# Patient Record
Sex: FEMALE | Race: BLACK OR AFRICAN AMERICAN | NOT HISPANIC OR LATINO | URBAN - METROPOLITAN AREA
[De-identification: names, ages, dates, MRNs, and addresses within clinical notes are randomized per-mention and may not be internally consistent; named-entity substitution may affect disease eponyms.]

---

## 2024-07-26 ENCOUNTER — APPOINTMENT (OUTPATIENT)
Dept: PRIMARY CARE | Facility: CLINIC | Age: 39
End: 2024-07-26
Payer: COMMERCIAL

## 2024-07-26 ENCOUNTER — LAB (OUTPATIENT)
Dept: LAB | Facility: LAB | Age: 39
End: 2024-07-26
Payer: COMMERCIAL

## 2024-07-26 VITALS
HEART RATE: 74 BPM | BODY MASS INDEX: 26.05 KG/M2 | HEIGHT: 63 IN | WEIGHT: 147 LBS | OXYGEN SATURATION: 98 % | SYSTOLIC BLOOD PRESSURE: 110 MMHG | DIASTOLIC BLOOD PRESSURE: 62 MMHG | RESPIRATION RATE: 19 BRPM

## 2024-07-26 DIAGNOSIS — Z12.31 SCREENING MAMMOGRAM FOR BREAST CANCER: ICD-10-CM

## 2024-07-26 DIAGNOSIS — J45.20 MILD INTERMITTENT ASTHMA WITHOUT COMPLICATION (HHS-HCC): ICD-10-CM

## 2024-07-26 DIAGNOSIS — E55.9 VITAMIN D DEFICIENCY: ICD-10-CM

## 2024-07-26 DIAGNOSIS — R03.0 ELEVATED BLOOD PRESSURE READING WITHOUT DIAGNOSIS OF HYPERTENSION: ICD-10-CM

## 2024-07-26 DIAGNOSIS — Z00.00 HEALTH CARE MAINTENANCE: ICD-10-CM

## 2024-07-26 DIAGNOSIS — Z30.09 BIRTH CONTROL COUNSELING: ICD-10-CM

## 2024-07-26 DIAGNOSIS — Z76.89 ENCOUNTER TO ESTABLISH CARE: ICD-10-CM

## 2024-07-26 DIAGNOSIS — Z00.00 HEALTH CARE MAINTENANCE: Primary | ICD-10-CM

## 2024-07-26 PROBLEM — N93.9 ABNORMAL UTERINE BLEEDING (AUB): Status: RESOLVED | Noted: 2024-07-26 | Resolved: 2024-07-26

## 2024-07-26 LAB
25(OH)D3 SERPL-MCNC: 32 NG/ML (ref 30–100)
ALBUMIN SERPL BCP-MCNC: 4.6 G/DL (ref 3.4–5)
ALP SERPL-CCNC: 71 U/L (ref 33–110)
ALT SERPL W P-5'-P-CCNC: 13 U/L (ref 7–45)
ANION GAP SERPL CALC-SCNC: 11 MMOL/L (ref 10–20)
AST SERPL W P-5'-P-CCNC: 16 U/L (ref 9–39)
BASOPHILS # BLD AUTO: 0.03 X10*3/UL (ref 0–0.1)
BASOPHILS NFR BLD AUTO: 0.6 %
BILIRUB SERPL-MCNC: 0.5 MG/DL (ref 0–1.2)
BUN SERPL-MCNC: 10 MG/DL (ref 6–23)
CALCIUM SERPL-MCNC: 9.6 MG/DL (ref 8.6–10.3)
CHLORIDE SERPL-SCNC: 105 MMOL/L (ref 98–107)
CHOLEST SERPL-MCNC: 180 MG/DL (ref 0–199)
CHOLESTEROL/HDL RATIO: 4.6
CO2 SERPL-SCNC: 25 MMOL/L (ref 21–32)
CREAT SERPL-MCNC: 0.93 MG/DL (ref 0.5–1.05)
EGFRCR SERPLBLD CKD-EPI 2021: 80 ML/MIN/1.73M*2
EOSINOPHIL # BLD AUTO: 0.08 X10*3/UL (ref 0–0.7)
EOSINOPHIL NFR BLD AUTO: 1.6 %
ERYTHROCYTE [DISTWIDTH] IN BLOOD BY AUTOMATED COUNT: 13 % (ref 11.5–14.5)
EST. AVERAGE GLUCOSE BLD GHB EST-MCNC: 117 MG/DL
GLUCOSE SERPL-MCNC: 80 MG/DL (ref 74–99)
HBA1C MFR BLD: 5.7 %
HCT VFR BLD AUTO: 41.3 % (ref 36–46)
HDLC SERPL-MCNC: 39.1 MG/DL
HGB BLD-MCNC: 13.3 G/DL (ref 12–16)
IMM GRANULOCYTES # BLD AUTO: 0.01 X10*3/UL (ref 0–0.7)
IMM GRANULOCYTES NFR BLD AUTO: 0.2 % (ref 0–0.9)
LDLC SERPL CALC-MCNC: 117 MG/DL
LYMPHOCYTES # BLD AUTO: 2.35 X10*3/UL (ref 1.2–4.8)
LYMPHOCYTES NFR BLD AUTO: 45.9 %
MCH RBC QN AUTO: 27.2 PG (ref 26–34)
MCHC RBC AUTO-ENTMCNC: 32.2 G/DL (ref 32–36)
MCV RBC AUTO: 85 FL (ref 80–100)
MONOCYTES # BLD AUTO: 0.27 X10*3/UL (ref 0.1–1)
MONOCYTES NFR BLD AUTO: 5.3 %
NEUTROPHILS # BLD AUTO: 2.38 X10*3/UL (ref 1.2–7.7)
NEUTROPHILS NFR BLD AUTO: 46.4 %
NON HDL CHOLESTEROL: 141 MG/DL (ref 0–149)
NRBC BLD-RTO: 0 /100 WBCS (ref 0–0)
PLATELET # BLD AUTO: 344 X10*3/UL (ref 150–450)
POTASSIUM SERPL-SCNC: 4.1 MMOL/L (ref 3.5–5.3)
PROT SERPL-MCNC: 7.6 G/DL (ref 6.4–8.2)
RBC # BLD AUTO: 4.89 X10*6/UL (ref 4–5.2)
SODIUM SERPL-SCNC: 137 MMOL/L (ref 136–145)
TRIGL SERPL-MCNC: 119 MG/DL (ref 0–149)
TSH SERPL-ACNC: 1.97 MIU/L (ref 0.44–3.98)
VLDL: 24 MG/DL (ref 0–40)
WBC # BLD AUTO: 5.1 X10*3/UL (ref 4.4–11.3)

## 2024-07-26 PROCEDURE — 84443 ASSAY THYROID STIM HORMONE: CPT

## 2024-07-26 PROCEDURE — 36415 COLL VENOUS BLD VENIPUNCTURE: CPT

## 2024-07-26 PROCEDURE — 1036F TOBACCO NON-USER: CPT

## 2024-07-26 PROCEDURE — 85025 COMPLETE CBC W/AUTO DIFF WBC: CPT

## 2024-07-26 PROCEDURE — 82306 VITAMIN D 25 HYDROXY: CPT

## 2024-07-26 PROCEDURE — 83036 HEMOGLOBIN GLYCOSYLATED A1C: CPT

## 2024-07-26 PROCEDURE — 3008F BODY MASS INDEX DOCD: CPT

## 2024-07-26 PROCEDURE — 80061 LIPID PANEL: CPT

## 2024-07-26 PROCEDURE — 80053 COMPREHEN METABOLIC PANEL: CPT

## 2024-07-26 PROCEDURE — 99386 PREV VISIT NEW AGE 40-64: CPT

## 2024-07-26 RX ORDER — DROSPIRENONE 4 MG/1
1 TABLET, FILM COATED ORAL DAILY
COMMUNITY
Start: 2022-08-09

## 2024-07-26 RX ORDER — ALBUTEROL SULFATE 90 UG/1
2 AEROSOL, METERED RESPIRATORY (INHALATION) EVERY 4 HOURS PRN
Qty: 6.7 G | Refills: 1 | Status: SHIPPED | OUTPATIENT
Start: 2024-07-26 | End: 2025-07-26

## 2024-07-26 RX ORDER — NORETHINDRONE 0.35 MG/1
1 TABLET ORAL DAILY
Qty: 28 TABLET | Refills: 12 | Status: SHIPPED | OUTPATIENT
Start: 2024-07-26 | End: 2024-07-26 | Stop reason: SDUPTHER

## 2024-07-26 RX ORDER — NORETHINDRONE 0.35 MG/1
1 TABLET ORAL DAILY
Qty: 28 TABLET | Refills: 12 | Status: SHIPPED | OUTPATIENT
Start: 2024-07-26 | End: 2025-07-26

## 2024-07-26 ASSESSMENT — ENCOUNTER SYMPTOMS
HEMATURIA: 0
BRUISES/BLEEDS EASILY: 0
DIFFICULTY URINATING: 0
PHOTOPHOBIA: 0
LOSS OF SENSATION IN FEET: 0
APNEA: 0
ANAL BLEEDING: 0
RECTAL PAIN: 0
MUSCULOSKELETAL NEGATIVE: 1
RESPIRATORY NEGATIVE: 1
TREMORS: 0
DIARRHEA: 0
NERVOUS/ANXIOUS: 0
CARDIOVASCULAR NEGATIVE: 1
DIAPHORESIS: 0
LIGHT-HEADEDNESS: 0
CHEST TIGHTNESS: 0
TROUBLE SWALLOWING: 0
BLOOD IN STOOL: 0
FEVER: 0
EYE DISCHARGE: 0
PALPITATIONS: 0
AGITATION: 0
PSYCHIATRIC NEGATIVE: 1
DYSPHORIC MOOD: 0
COUGH: 0
COLOR CHANGE: 0
ABDOMINAL PAIN: 0
STRIDOR: 0
MYALGIAS: 0
POLYPHAGIA: 0
CONFUSION: 0
HEADACHES: 0
NAUSEA: 0
APPETITE CHANGE: 0
SLEEP DISTURBANCE: 0
NECK PAIN: 0
WHEEZING: 0
FREQUENCY: 0
EYES NEGATIVE: 1
VOICE CHANGE: 0
NECK STIFFNESS: 0
DEPRESSION: 0
HYPERACTIVE: 0
ABDOMINAL DISTENTION: 0
ENDOCRINE NEGATIVE: 1
OCCASIONAL FEELINGS OF UNSTEADINESS: 0
BACK PAIN: 0
FATIGUE: 0
ACTIVITY CHANGE: 0
NEUROLOGICAL NEGATIVE: 1
CONSTITUTIONAL NEGATIVE: 1
JOINT SWELLING: 0
RHINORRHEA: 0
SPEECH DIFFICULTY: 0
GASTROINTESTINAL NEGATIVE: 1
SHORTNESS OF BREATH: 0
UNEXPECTED WEIGHT CHANGE: 0
WEAKNESS: 0
DIZZINESS: 0
SINUS PRESSURE: 0
POLYDIPSIA: 0
SEIZURES: 0
DYSURIA: 0
CHILLS: 0
HEMATOLOGIC/LYMPHATIC NEGATIVE: 1
FLANK PAIN: 0
SORE THROAT: 0
CONSTIPATION: 0
NUMBNESS: 0

## 2024-07-26 ASSESSMENT — PATIENT HEALTH QUESTIONNAIRE - PHQ9
2. FEELING DOWN, DEPRESSED OR HOPELESS: NOT AT ALL
1. LITTLE INTEREST OR PLEASURE IN DOING THINGS: NOT AT ALL
SUM OF ALL RESPONSES TO PHQ9 QUESTIONS 1 AND 2: 0

## 2024-07-26 NOTE — PROGRESS NOTES
"Reason for Visit: Annual Physical Exam    HPI:  HPI    Health Maintenance    NPV/ est care    Apri birth control- got bad migraines on this; doing well on Slynd birth control    Mammogram: due  PAP test: last pap with 6/18/2022 NEGATIVE FOR INTRAEPITHELIAL LESION OR MALIGNANCY. Neg HPV testing. Due next year.  Colonoscopy: due at age 44 unless otherwise indicated  Never smoker  Hgb A1c: No results found for: \"HGBA1C\"  Cholesterol panel: No results found for: \"CHOL\"  Depression PHQ-2: 0  TDAP: due- local pharmacy  Influenza vaccine: yearly  Eye Examinations: due  Dental Examinations: due    No chest pain, no SOB, BM regular, no trouble urinating, energy level is good    Active Problem List  Patient Active Problem List   Diagnosis    Asthma ([facility])    Elevated blood pressure reading without diagnosis of hypertension    Papanicolaou smear of cervix with low grade squamous intraepithelial lesion (LGSIL)    Vitamin D deficiency    Birth control counseling       Comprehensive Medical/Surgical/Social/Family History  Past Medical History:   Diagnosis Date    Abnormal uterine bleeding (AUB) 07/26/2024    Other seasonal allergic rhinitis     Seasonal allergies    Personal history of other benign neoplasm 06/13/2019    History of other benign neoplasm    Personal history of other benign neoplasm 06/21/2019    History of other benign neoplasm    Personal history of other diseases of the respiratory system     History of asthma    Vulvar ulcer 12/09/2011     Past Surgical History:   Procedure Laterality Date    OTHER SURGICAL HISTORY  06/21/2019    Uterine myomectomy     Social History     Social History Narrative    Not on file         Allergies and Medications  Adhesive  Current Outpatient Medications on File Prior to Visit   Medication Sig Dispense Refill    Slynd 4 mg (28) tablet Take 1 tablet by mouth once daily.       No current facility-administered medications on file prior to visit.         ROS otherwise negative " "aside from what was mentioned above in HPI.  Review of Systems   Constitutional: Negative.  Negative for activity change, appetite change, chills, diaphoresis, fatigue, fever and unexpected weight change.   HENT: Negative.  Negative for congestion, dental problem, ear discharge, ear pain, hearing loss, mouth sores, nosebleeds, postnasal drip, rhinorrhea, sinus pressure, sneezing, sore throat, tinnitus, trouble swallowing and voice change.    Eyes: Negative.  Negative for photophobia, discharge and visual disturbance.   Respiratory: Negative.  Negative for apnea, cough, chest tightness, shortness of breath, wheezing and stridor.    Cardiovascular: Negative.  Negative for chest pain, palpitations and leg swelling.   Gastrointestinal: Negative.  Negative for abdominal distention, abdominal pain, anal bleeding, blood in stool, constipation, diarrhea, nausea and rectal pain.   Endocrine: Negative.  Negative for cold intolerance, heat intolerance, polydipsia, polyphagia and polyuria.   Genitourinary: Negative.  Negative for decreased urine volume, difficulty urinating, dysuria, flank pain, frequency, hematuria and urgency.   Musculoskeletal: Negative.  Negative for back pain, gait problem, joint swelling, myalgias, neck pain and neck stiffness.   Skin: Negative.  Negative for color change and rash.   Neurological: Negative.  Negative for dizziness, tremors, seizures, syncope, speech difficulty, weakness, light-headedness, numbness and headaches.   Hematological: Negative.  Does not bruise/bleed easily.   Psychiatric/Behavioral: Negative.  Negative for agitation, confusion, dysphoric mood, sleep disturbance and suicidal ideas. The patient is not nervous/anxious and is not hyperactive.    All other systems reviewed and are negative.        Vitals  /62   Pulse 74   Resp 19   Ht 1.6 m (5' 3\")   Wt 66.7 kg (147 lb)   SpO2 98%   BMI 26.04 kg/m²   Body mass index is 26.04 kg/m².    Physical Exam  Physical " Exam  Vitals reviewed.   Constitutional:       General: She is not in acute distress.     Appearance: Normal appearance. She is normal weight. She is not ill-appearing, toxic-appearing or diaphoretic.   HENT:      Head: Normocephalic and atraumatic.      Right Ear: Tympanic membrane, ear canal and external ear normal. There is no impacted cerumen.      Left Ear: Tympanic membrane, ear canal and external ear normal. There is no impacted cerumen.      Nose: Nose normal.   Eyes:      Extraocular Movements: Extraocular movements intact.      Conjunctiva/sclera: Conjunctivae normal.      Pupils: Pupils are equal, round, and reactive to light.   Cardiovascular:      Rate and Rhythm: Normal rate and regular rhythm.      Pulses: Normal pulses.      Heart sounds: Normal heart sounds. No murmur heard.     No friction rub. No gallop.   Pulmonary:      Effort: Pulmonary effort is normal. No respiratory distress.      Breath sounds: Normal breath sounds.   Abdominal:      General: Abdomen is flat. Bowel sounds are normal.      Palpations: Abdomen is soft.   Musculoskeletal:         General: Normal range of motion.      Cervical back: Normal range of motion and neck supple.   Lymphadenopathy:      Cervical: No cervical adenopathy.   Skin:     General: Skin is warm and dry.      Capillary Refill: Capillary refill takes less than 2 seconds.   Neurological:      General: No focal deficit present.      Mental Status: She is alert and oriented to person, place, and time. Mental status is at baseline.   Psychiatric:         Mood and Affect: Mood normal.         Behavior: Behavior normal.         Thought Content: Thought content normal.         Judgment: Judgment normal.           Assessment and Plan:  Problem List Items Addressed This Visit    Health Maintenance    NPV/ est care    Apri birth control- got bad migraines on this; doing well on Slynd birth control; Slynd RF too expensive- switch to BC that is covered     Mammogram:  "due  PAP test: last pap with 6/18/2022 NEGATIVE FOR INTRAEPITHELIAL LESION OR MALIGNANCY. Neg HPV testing. Due next year.  Colonoscopy: due at age 44 unless otherwise indicated  Never smoker  Hgb A1c: No results found for: \"HGBA1C\"  Cholesterol panel: No results found for: \"CHOL\"  Depression PHQ-2: 0  TDAP: due- local pharmacy  Influenza vaccine: yearly  Eye Examinations: due  Dental Examinations: due         ICD-10-CM    Asthma ([facility]) J45.909    Stable  C/w albuterol inhaler PRN sparingly   Relevant Orders    BI mammo bilateral screening tomosynthesis    Lipid Panel    Hemoglobin A1C    TSH with reflex to Free T4 if abnormal    CBC and Auto Differential    Comprehensive metabolic panel    Urinalysis with Reflex Microscopic    Elevated blood pressure reading without diagnosis of hypertension R03.0    Stable; BP good today  Relevant Orders    BI mammo bilateral screening tomosynthesis    Lipid Panel    Hemoglobin A1C    TSH with reflex to Free T4 if abnormal    CBC and Auto Differential    Comprehensive metabolic panel    Urinalysis with Reflex Microscopic    Vitamin D deficiency E55.9    Relevant Orders    Vitamin D 25-Hydroxy,Total (for eval of Vitamin D levels)    Birth control counseling - Primary Z30.09    Relevant Medications    norethindrone (Mónica) 0.35 mg tablet  Apri birth control- got bad migraines on this; doing well on Slynd birth control; Slynd RF too expensive- switch to BC that is covered          Encourage diet and exercise to maintain healthy lifestyle.     Follow up in 1 year or sooner as needed    MIKE Mann-CNP  "

## 2024-08-23 ENCOUNTER — APPOINTMENT (OUTPATIENT)
Dept: RADIOLOGY | Facility: CLINIC | Age: 39
End: 2024-08-23
Payer: COMMERCIAL

## 2024-08-29 ENCOUNTER — PATIENT MESSAGE (OUTPATIENT)
Dept: PRIMARY CARE | Facility: CLINIC | Age: 39
End: 2024-08-29
Payer: COMMERCIAL

## 2024-08-29 DIAGNOSIS — Z30.09 BIRTH CONTROL COUNSELING: Primary | ICD-10-CM

## 2024-08-29 DIAGNOSIS — N92.0 MENORRHAGIA WITH REGULAR CYCLE: ICD-10-CM

## 2024-08-30 PROBLEM — N92.0 MENORRHAGIA WITH REGULAR CYCLE: Status: ACTIVE | Noted: 2024-08-30

## 2024-08-30 RX ORDER — DROSPIRENONE 4 MG/1
1 TABLET, FILM COATED ORAL DAILY
Qty: 28 TABLET | Refills: 11 | Status: SHIPPED | OUTPATIENT
Start: 2024-08-30

## 2024-09-13 ENCOUNTER — APPOINTMENT (OUTPATIENT)
Dept: RADIOLOGY | Facility: CLINIC | Age: 39
End: 2024-09-13
Payer: COMMERCIAL

## 2024-09-19 ENCOUNTER — HOSPITAL ENCOUNTER (OUTPATIENT)
Dept: RADIOLOGY | Facility: CLINIC | Age: 39
Discharge: HOME | End: 2024-09-19
Payer: COMMERCIAL

## 2024-09-19 VITALS — BODY MASS INDEX: 26.05 KG/M2 | HEIGHT: 63 IN | WEIGHT: 147 LBS

## 2024-09-19 DIAGNOSIS — Z12.31 SCREENING MAMMOGRAM FOR BREAST CANCER: ICD-10-CM

## 2024-09-19 DIAGNOSIS — Z00.00 HEALTH CARE MAINTENANCE: ICD-10-CM

## 2024-09-19 DIAGNOSIS — Z76.89 ENCOUNTER TO ESTABLISH CARE: ICD-10-CM

## 2024-09-19 DIAGNOSIS — R03.0 ELEVATED BLOOD PRESSURE READING WITHOUT DIAGNOSIS OF HYPERTENSION: ICD-10-CM

## 2024-09-19 DIAGNOSIS — J45.20 MILD INTERMITTENT ASTHMA WITHOUT COMPLICATION (HHS-HCC): ICD-10-CM

## 2024-09-19 PROCEDURE — 77067 SCR MAMMO BI INCL CAD: CPT

## 2024-09-27 ENCOUNTER — TELEPHONE (OUTPATIENT)
Dept: PRIMARY CARE | Facility: CLINIC | Age: 39
End: 2024-09-27
Payer: COMMERCIAL

## 2024-09-27 NOTE — TELEPHONE ENCOUNTER
Called Jhon back.Line was Busy.  Pt is only taking Slynd. I only have drospirenone, contraceptive, (Slynd) 4 mg (28) tablet on her medication list. No to Mónica.

## 2024-09-27 NOTE — TELEPHONE ENCOUNTER
Jeannette from [facility] called in ans wanted to know if the pt is just taking Slynd) 4 mg (28) tablet   Or is she taking norethindrone (Mónica) 0.35 mg tablet as well? It says discontinued bu the reason was it was reordered.

## 2024-09-30 ENCOUNTER — TELEPHONE (OUTPATIENT)
Dept: PRIMARY CARE | Facility: CLINIC | Age: 39
End: 2024-09-30
Payer: COMMERCIAL

## 2024-09-30 NOTE — TELEPHONE ENCOUNTER
Niko called and stated that if the pt was to take the norethindrone (Mónica) 0.35 mg tablet there wouldn't be a co-pay. They wanted to inform the PCP.

## 2024-10-01 NOTE — TELEPHONE ENCOUNTER
Called the pt to see if the patient is okay with taking norethindrone (Mónica) 0.35 mg for birth control? Her insurance said there would be no copay. Attempted to call and the VM is Full. Sent a Jivox message as well.

## 2025-07-14 DIAGNOSIS — Z30.09 BIRTH CONTROL COUNSELING: ICD-10-CM

## 2025-07-14 RX ORDER — DROSPIRENONE 4 MG/1
1 TABLET, FILM COATED ORAL DAILY
Qty: 28 TABLET | Refills: 1 | Status: SHIPPED | OUTPATIENT
Start: 2025-07-14

## 2025-07-14 NOTE — TELEPHONE ENCOUNTER
Need follow up appointment for refills. Please call and schedule yearly wellness exam. Last saw 7/26/2024. Okay to give refills until time of appointment